# Patient Record
Sex: MALE | Race: WHITE | URBAN - METROPOLITAN AREA
[De-identification: names, ages, dates, MRNs, and addresses within clinical notes are randomized per-mention and may not be internally consistent; named-entity substitution may affect disease eponyms.]

---
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
## 2021-12-03 NOTE — ED ADULT NURSE NOTE - PMH
Chest pain  10/18 -10/19, [id number]  High blood pressure Patient/Caregiver provided printed discharge information.